# Patient Record
Sex: OTHER/UNKNOWN | URBAN - METROPOLITAN AREA
[De-identification: names, ages, dates, MRNs, and addresses within clinical notes are randomized per-mention and may not be internally consistent; named-entity substitution may affect disease eponyms.]

---

## 2019-06-28 ENCOUNTER — APPOINTMENT (OUTPATIENT)
Dept: URBAN - METROPOLITAN AREA CLINIC 290 | Age: 24
Setting detail: DERMATOLOGY
End: 2019-07-10

## 2019-06-28 DIAGNOSIS — Z41.9 ENCOUNTER FOR PROCEDURE FOR PURPOSES OTHER THAN REMEDYING HEALTH STATE, UNSPECIFIED: ICD-10-CM

## 2019-06-28 PROCEDURE — OTHER OTHER (COSMETIC): OTHER

## 2019-06-28 PROCEDURE — OTHER BOTOX: OTHER

## 2019-06-28 NOTE — PROCEDURE: OTHER (COSMETIC)
Detail Level: Zone
Other (Free Text): Patient declined Botox photos\\n\\nFor desired results we recommended using a full syringe for Glabella, full syringe for crows feet and half syringe for forehead for a total of 2 and a half syringes (50 units) \\nPatient wants to only use a syringe and a quarter (25units) to have split between all of the areas and understands she may not get a full effect
no abdominal pain, no bloating, no constipation, no diarrhea, no nausea and no vomiting.

## 2019-06-28 NOTE — PROCEDURE: BOTOX
Price (Use Numbers Only, No Special Characters Or $): 615 Price (Use Numbers Only, No Special Characters Or $): 864
